# Patient Record
Sex: FEMALE | Race: OTHER | Employment: FULL TIME | URBAN - METROPOLITAN AREA
[De-identification: names, ages, dates, MRNs, and addresses within clinical notes are randomized per-mention and may not be internally consistent; named-entity substitution may affect disease eponyms.]

---

## 2018-02-01 PROBLEM — J02.9 ALLERGIC PHARYNGITIS: Status: ACTIVE | Noted: 2017-03-12

## 2018-02-01 PROBLEM — R09.81 NASAL CONGESTION: Status: ACTIVE | Noted: 2018-01-11

## 2018-04-19 PROBLEM — B37.31 YEAST VAGINITIS: Status: ACTIVE | Noted: 2018-04-19

## 2018-05-04 PROBLEM — D22.30 MELANOCYTIC NEVI OF FACE: Status: ACTIVE | Noted: 2018-05-04

## 2018-06-04 PROBLEM — D23.9 INTRADERMAL NEVUS: Status: ACTIVE | Noted: 2018-06-04

## 2019-02-20 ENCOUNTER — OFFICE VISIT (OUTPATIENT)
Dept: PRIMARY CARE CLINIC | Age: 24
End: 2019-02-20
Payer: COMMERCIAL

## 2019-02-20 VITALS
HEART RATE: 69 BPM | TEMPERATURE: 98.2 F | OXYGEN SATURATION: 99 % | BODY MASS INDEX: 26.01 KG/M2 | WEIGHT: 133.2 LBS | SYSTOLIC BLOOD PRESSURE: 106 MMHG | DIASTOLIC BLOOD PRESSURE: 74 MMHG

## 2019-02-20 DIAGNOSIS — R30.0 DYSURIA: Primary | ICD-10-CM

## 2019-02-20 DIAGNOSIS — N89.8 VAGINAL DISCHARGE: ICD-10-CM

## 2019-02-20 LAB
BILIRUBIN, POC: NORMAL
BLOOD URINE, POC: NORMAL
CLARITY, POC: CLEAR
COLOR, POC: YELLOW
GLUCOSE URINE, POC: NORMAL
KETONES, POC: NORMAL
LEUKOCYTE EST, POC: NORMAL
NITRITE, POC: NORMAL
PH, POC: 7
PROTEIN, POC: NORMAL
SPECIFIC GRAVITY, POC: 1.01
UROBILINOGEN, POC: 0.2

## 2019-02-20 PROCEDURE — 81003 URINALYSIS AUTO W/O SCOPE: CPT | Performed by: PHYSICIAN ASSISTANT

## 2019-02-20 PROCEDURE — 99213 OFFICE O/P EST LOW 20 MIN: CPT | Performed by: PHYSICIAN ASSISTANT

## 2019-02-20 ASSESSMENT — PATIENT HEALTH QUESTIONNAIRE - PHQ9
SUM OF ALL RESPONSES TO PHQ9 QUESTIONS 1 & 2: 0
SUM OF ALL RESPONSES TO PHQ QUESTIONS 1-9: 0
1. LITTLE INTEREST OR PLEASURE IN DOING THINGS: 0
2. FEELING DOWN, DEPRESSED OR HOPELESS: 0
SUM OF ALL RESPONSES TO PHQ QUESTIONS 1-9: 0

## 2019-02-21 ENCOUNTER — TELEPHONE (OUTPATIENT)
Dept: PRIMARY CARE CLINIC | Age: 24
End: 2019-02-21

## 2019-02-21 LAB
CANDIDA SPECIES, DNA PROBE: NEGATIVE
GARDNERELLA VAGINALIS, DNA PROBE: NEGATIVE
TRICHOMONAS VAGINALIS DNA: NEGATIVE

## 2019-02-26 ASSESSMENT — ENCOUNTER SYMPTOMS
VOMITING: 0
NAUSEA: 0
ABDOMINAL PAIN: 0

## 2019-04-15 RX ORDER — FEXOFENADINE HCL 180 MG/1
180 TABLET ORAL DAILY
Qty: 30 TABLET | Refills: 3 | Status: SHIPPED | OUTPATIENT
Start: 2019-04-15 | End: 2022-03-04

## 2019-10-22 ENCOUNTER — TELEPHONE (OUTPATIENT)
Dept: ADMINISTRATIVE | Age: 24
End: 2019-10-22

## 2019-10-28 ENCOUNTER — OFFICE VISIT (OUTPATIENT)
Dept: PRIMARY CARE CLINIC | Age: 24
End: 2019-10-28
Payer: COMMERCIAL

## 2019-10-28 VITALS
DIASTOLIC BLOOD PRESSURE: 68 MMHG | OXYGEN SATURATION: 97 % | HEIGHT: 60 IN | WEIGHT: 139.6 LBS | SYSTOLIC BLOOD PRESSURE: 118 MMHG | BODY MASS INDEX: 27.41 KG/M2 | HEART RATE: 87 BPM

## 2019-10-28 DIAGNOSIS — L20.9 ATOPIC DERMATITIS, UNSPECIFIED TYPE: ICD-10-CM

## 2019-10-28 PROCEDURE — 99213 OFFICE O/P EST LOW 20 MIN: CPT | Performed by: PHYSICIAN ASSISTANT

## 2019-10-28 ASSESSMENT — ENCOUNTER SYMPTOMS
COLOR CHANGE: 0
RESPIRATORY NEGATIVE: 1
EYES NEGATIVE: 1
ABDOMINAL PAIN: 0

## 2020-12-09 ENCOUNTER — TELEPHONE (OUTPATIENT)
Dept: PRIMARY CARE CLINIC | Age: 25
End: 2020-12-09

## 2020-12-09 NOTE — TELEPHONE ENCOUNTER
Pt calling to ask if it is okay for her to get the Flu vaccine? Back in 2018 she was dx'd with Latent TB and was told not to get it and she did not get it last year. Her job requires her to get it this year. Please advise. [phone]. Can leave a detailed message, if pt does not .

## 2021-01-15 ENCOUNTER — TELEPHONE (OUTPATIENT)
Dept: PRIMARY CARE CLINIC | Age: 26
End: 2021-01-15

## 2021-01-15 NOTE — TELEPHONE ENCOUNTER
[facility] received a call from:    Name of Caller: Bryon Kenyon    Relationship to patient: self    Organization name: n/a    Best contact number: [phone]    Reason for call:  Patient tested postive for Covid 19. She is asking for her provider to call her in a prescription for an inhaler due to SOB.   She is asking that it be sent to pharmacy listed below    [facility]  [address]  [place] [phone]        Thank you

## 2021-01-19 RX ORDER — ALBUTEROL SULFATE 90 UG/1
2 AEROSOL, METERED RESPIRATORY (INHALATION) EVERY 6 HOURS PRN
Qty: 1 INHALER | Refills: 3 | Status: SHIPPED | OUTPATIENT
Start: 2021-01-19 | End: 2022-03-04

## 2021-01-20 NOTE — TELEPHONE ENCOUNTER
Sent in  [address]- please call pt and apologize for the delay and also find out what happened. Let pt know that when she calls if she doesn't hear back within 24 to 48 hours she needs to call back.   Also please let her know that inhalers don't really work that great for the SOB that people get with covid but okay to try it

## 2022-03-04 ENCOUNTER — OFFICE VISIT (OUTPATIENT)
Dept: PRIMARY CARE CLINIC | Age: 27
End: 2022-03-04
Payer: COMMERCIAL

## 2022-03-04 VITALS
HEIGHT: 60 IN | SYSTOLIC BLOOD PRESSURE: 122 MMHG | DIASTOLIC BLOOD PRESSURE: 82 MMHG | WEIGHT: 139.8 LBS | HEART RATE: 113 BPM | BODY MASS INDEX: 27.45 KG/M2 | OXYGEN SATURATION: 98 %

## 2022-03-04 DIAGNOSIS — R00.2 PALPITATION: Primary | ICD-10-CM

## 2022-03-04 DIAGNOSIS — R00.2 PALPITATION: ICD-10-CM

## 2022-03-04 LAB
ABSOLUTE EOS #: 0.27 K/UL (ref 0–0.44)
ABSOLUTE IMMATURE GRANULOCYTE: 0.04 K/UL (ref 0–0.3)
ABSOLUTE LYMPH #: 2 K/UL (ref 1.1–3.7)
ABSOLUTE MONO #: 0.78 K/UL (ref 0.1–1.2)
BASOPHILS # BLD: 1 % (ref 0–2)
BASOPHILS ABSOLUTE: 0.12 K/UL (ref 0–0.2)
DIFFERENTIAL TYPE: ABNORMAL
EOSINOPHILS RELATIVE PERCENT: 2 % (ref 1–4)
HCT VFR BLD CALC: 37.3 % (ref 36.3–47.1)
HEMOGLOBIN: 12.1 G/DL (ref 11.9–15.1)
IMMATURE GRANULOCYTES: 0 %
LYMPHOCYTES # BLD: 17 % (ref 24–43)
MCH RBC QN AUTO: 30.4 PG (ref 25.2–33.5)
MCHC RBC AUTO-ENTMCNC: 32.4 G/DL (ref 28.4–34.8)
MCV RBC AUTO: 93.7 FL (ref 82.6–102.9)
MONOCYTES # BLD: 7 % (ref 3–12)
NRBC AUTOMATED: 0 PER 100 WBC
PDW BLD-RTO: 12.2 % (ref 11.8–14.4)
PLATELET # BLD: 397 K/UL (ref 138–453)
PLATELET ESTIMATE: ABNORMAL
PMV BLD AUTO: 11 FL (ref 8.1–13.5)
RBC # BLD: 3.98 M/UL (ref 3.95–5.11)
RBC # BLD: ABNORMAL 10*6/UL
SEG NEUTROPHILS: 73 % (ref 36–65)
SEGMENTED NEUTROPHILS ABSOLUTE COUNT: 8.31 K/UL (ref 1.5–8.1)
WBC # BLD: 11.5 K/UL (ref 3.5–11.3)
WBC # BLD: ABNORMAL 10*3/UL

## 2022-03-04 PROCEDURE — G8484 FLU IMMUNIZE NO ADMIN: HCPCS | Performed by: PHYSICIAN ASSISTANT

## 2022-03-04 PROCEDURE — 1036F TOBACCO NON-USER: CPT | Performed by: PHYSICIAN ASSISTANT

## 2022-03-04 PROCEDURE — G8427 DOCREV CUR MEDS BY ELIG CLIN: HCPCS | Performed by: PHYSICIAN ASSISTANT

## 2022-03-04 PROCEDURE — G8419 CALC BMI OUT NRM PARAM NOF/U: HCPCS | Performed by: PHYSICIAN ASSISTANT

## 2022-03-04 PROCEDURE — 99213 OFFICE O/P EST LOW 20 MIN: CPT | Performed by: PHYSICIAN ASSISTANT

## 2022-03-04 SDOH — ECONOMIC STABILITY: FOOD INSECURITY: WITHIN THE PAST 12 MONTHS, THE FOOD YOU BOUGHT JUST DIDN'T LAST AND YOU DIDN'T HAVE MONEY TO GET MORE.: NEVER TRUE

## 2022-03-04 SDOH — ECONOMIC STABILITY: FOOD INSECURITY: WITHIN THE PAST 12 MONTHS, YOU WORRIED THAT YOUR FOOD WOULD RUN OUT BEFORE YOU GOT MONEY TO BUY MORE.: NEVER TRUE

## 2022-03-04 ASSESSMENT — ENCOUNTER SYMPTOMS: SHORTNESS OF BREATH: 1

## 2022-03-04 ASSESSMENT — PATIENT HEALTH QUESTIONNAIRE - PHQ9
SUM OF ALL RESPONSES TO PHQ QUESTIONS 1-9: 0
SUM OF ALL RESPONSES TO PHQ9 QUESTIONS 1 & 2: 0
2. FEELING DOWN, DEPRESSED OR HOPELESS: 0
1. LITTLE INTEREST OR PLEASURE IN DOING THINGS: 0
SUM OF ALL RESPONSES TO PHQ QUESTIONS 1-9: 0

## 2022-03-04 ASSESSMENT — SOCIAL DETERMINANTS OF HEALTH (SDOH): HOW HARD IS IT FOR YOU TO PAY FOR THE VERY BASICS LIKE FOOD, HOUSING, MEDICAL CARE, AND HEATING?: NOT HARD AT ALL

## 2022-03-05 LAB
ALBUMIN SERPL-MCNC: 4.8 G/DL (ref 3.5–5.2)
ALBUMIN/GLOBULIN RATIO: 1.8 (ref 1–2.5)
ALP BLD-CCNC: 57 U/L (ref 35–104)
ALT SERPL-CCNC: 9 U/L (ref 5–33)
ANION GAP SERPL CALCULATED.3IONS-SCNC: 23 MMOL/L (ref 9–17)
AST SERPL-CCNC: 15 U/L
BILIRUB SERPL-MCNC: 0.39 MG/DL (ref 0.3–1.2)
BUN BLDV-MCNC: 7 MG/DL (ref 6–20)
BUN/CREAT BLD: ABNORMAL (ref 9–20)
CALCIUM SERPL-MCNC: 10.2 MG/DL (ref 8.6–10.4)
CHLORIDE BLD-SCNC: 109 MMOL/L (ref 98–107)
CO2: 19 MMOL/L (ref 20–31)
CREAT SERPL-MCNC: 0.61 MG/DL (ref 0.5–0.9)
GFR AFRICAN AMERICAN: >60 ML/MIN
GFR NON-AFRICAN AMERICAN: >60 ML/MIN
GFR SERPL CREATININE-BSD FRML MDRD: ABNORMAL ML/MIN/{1.73_M2}
GFR SERPL CREATININE-BSD FRML MDRD: ABNORMAL ML/MIN/{1.73_M2}
GLUCOSE FASTING: 74 MG/DL (ref 70–99)
POTASSIUM SERPL-SCNC: 4.6 MMOL/L (ref 3.7–5.3)
SODIUM BLD-SCNC: 151 MMOL/L (ref 135–144)
TOTAL PROTEIN: 7.5 G/DL (ref 6.4–8.3)
TSH SERPL DL<=0.05 MIU/L-ACNC: 1.41 MIU/L (ref 0.3–5)

## 2022-03-08 ENCOUNTER — TELEPHONE (OUTPATIENT)
Dept: PRIMARY CARE CLINIC | Age: 27
End: 2022-03-08

## 2022-03-08 DIAGNOSIS — E87.0 HYPERNATREMIA: Primary | ICD-10-CM

## 2022-03-08 NOTE — TELEPHONE ENCOUNTER
Patient states she does not take any water pills, does not drink any electrolytes solutions. Patient states she does drink Collagen - unsure if this would cause any changes. Patient takes a multivitamin, calcium & B-12 OTC. States that occasionally she will drink a CORE water, but not often. Denies using salt on foods - states she uses salt and pepper while cooking but no added salt once food is done. Pt denies confusion or any new sx's at this time. Patient will complete blood work.

## 2022-03-08 NOTE — TELEPHONE ENCOUNTER
----- Message from Alona Gutierrez sent at 3/7/2022  1:29 PM EST -----  Subject: Results Request    QUESTIONS  Which lab or imaging result is the patient calling about? lab results  Which provider ordered the test? [facility]    [facility]  Date the test was performed? 2022-03-04  Additional Information for Provider? Pt is calling requesting her lab   results from labs taken on 3/4. Unable to contact office at this time. Please call pt with these results. ---------------------------------------------------------------------------  --------------  Lear How INFO  What is the best way for the office to contact you? OK to leave message on   voicemail  Preferred Call Back Phone Number?  [phone]

## 2022-03-09 DIAGNOSIS — E87.0 HYPERNATREMIA: ICD-10-CM

## 2022-03-09 LAB
ANION GAP SERPL CALCULATED.3IONS-SCNC: 22 MMOL/L (ref 9–17)
BUN BLDV-MCNC: 17 MG/DL (ref 6–20)
BUN/CREAT BLD: ABNORMAL (ref 9–20)
CALCIUM SERPL-MCNC: 10.4 MG/DL (ref 8.6–10.4)
CHLORIDE BLD-SCNC: 100 MMOL/L (ref 98–107)
CO2: 20 MMOL/L (ref 20–31)
CREAT SERPL-MCNC: 0.68 MG/DL (ref 0.5–0.9)
GFR AFRICAN AMERICAN: >60 ML/MIN
GFR NON-AFRICAN AMERICAN: >60 ML/MIN
GFR SERPL CREATININE-BSD FRML MDRD: ABNORMAL ML/MIN/{1.73_M2}
GFR SERPL CREATININE-BSD FRML MDRD: ABNORMAL ML/MIN/{1.73_M2}
GLUCOSE BLD-MCNC: 86 MG/DL (ref 70–99)
POTASSIUM SERPL-SCNC: 5 MMOL/L (ref 3.7–5.3)
SODIUM BLD-SCNC: 142 MMOL/L (ref 135–144)

## 2022-03-18 LAB — ANTI-DIURETIC HORMONE: <0.5 PG/ML (ref 0–6.9)

## 2022-10-10 ENCOUNTER — TELEPHONE (OUTPATIENT)
Dept: PRIMARY CARE CLINIC | Age: 27
End: 2022-10-10

## 2022-10-10 DIAGNOSIS — E87.0 SERUM SODIUM ELEVATED: Primary | ICD-10-CM

## 2022-10-10 DIAGNOSIS — F40.243 ANXIETY WITH FLYING: ICD-10-CM

## 2022-10-10 DIAGNOSIS — R79.89 ELEVATED LFTS: ICD-10-CM

## 2022-10-10 RX ORDER — DIAZEPAM 2 MG/1
TABLET ORAL
Qty: 10 TABLET | Refills: 0 | Status: SHIPPED | OUTPATIENT
Start: 2022-10-10 | End: 2022-10-19

## 2022-10-10 NOTE — TELEPHONE ENCOUNTER
I sent in some valium for her. She had an elevated Sodium on last labs in March and I wanted a repeat. Please ask her to have that done.

## 2022-10-10 NOTE — TELEPHONE ENCOUNTER
Pt is flying out on Wed and her anxiety is getting worse. Asking, if something can be sent in to help her stay calm for the flight wed and for the flight back? Uses [facility].

## 2023-07-10 ENCOUNTER — OFFICE VISIT (OUTPATIENT)
Dept: PRIMARY CARE CLINIC | Age: 28
End: 2023-07-10
Payer: COMMERCIAL

## 2023-07-10 VITALS
HEIGHT: 61 IN | SYSTOLIC BLOOD PRESSURE: 110 MMHG | BODY MASS INDEX: 29.45 KG/M2 | OXYGEN SATURATION: 97 % | HEART RATE: 86 BPM | DIASTOLIC BLOOD PRESSURE: 68 MMHG | WEIGHT: 156 LBS

## 2023-07-10 DIAGNOSIS — R53.1 WEAKNESS: Primary | ICD-10-CM

## 2023-07-10 DIAGNOSIS — R53.83 OTHER FATIGUE: ICD-10-CM

## 2023-07-10 PROCEDURE — 99214 OFFICE O/P EST MOD 30 MIN: CPT | Performed by: FAMILY MEDICINE

## 2023-07-10 SDOH — ECONOMIC STABILITY: FOOD INSECURITY: WITHIN THE PAST 12 MONTHS, YOU WORRIED THAT YOUR FOOD WOULD RUN OUT BEFORE YOU GOT MONEY TO BUY MORE.: NEVER TRUE

## 2023-07-10 SDOH — ECONOMIC STABILITY: HOUSING INSECURITY
IN THE LAST 12 MONTHS, WAS THERE A TIME WHEN YOU DID NOT HAVE A STEADY PLACE TO SLEEP OR SLEPT IN A SHELTER (INCLUDING NOW)?: NO

## 2023-07-10 SDOH — ECONOMIC STABILITY: FOOD INSECURITY: WITHIN THE PAST 12 MONTHS, THE FOOD YOU BOUGHT JUST DIDN'T LAST AND YOU DIDN'T HAVE MONEY TO GET MORE.: NEVER TRUE

## 2023-07-10 SDOH — ECONOMIC STABILITY: INCOME INSECURITY: HOW HARD IS IT FOR YOU TO PAY FOR THE VERY BASICS LIKE FOOD, HOUSING, MEDICAL CARE, AND HEATING?: NOT HARD AT ALL

## 2023-07-10 ASSESSMENT — PATIENT HEALTH QUESTIONNAIRE - PHQ9
SUM OF ALL RESPONSES TO PHQ QUESTIONS 1-9: 0
1. LITTLE INTEREST OR PLEASURE IN DOING THINGS: 0
SUM OF ALL RESPONSES TO PHQ QUESTIONS 1-9: 0
SUM OF ALL RESPONSES TO PHQ9 QUESTIONS 1 & 2: 0
2. FEELING DOWN, DEPRESSED OR HOPELESS: 0

## 2023-07-10 ASSESSMENT — ENCOUNTER SYMPTOMS
TROUBLE SWALLOWING: 1
VOMITING: 0
DIARRHEA: 0
COUGH: 0
SINUS PRESSURE: 1
CONSTIPATION: 1
SHORTNESS OF BREATH: 0
NAUSEA: 0

## 2023-07-12 DIAGNOSIS — R53.83 OTHER FATIGUE: ICD-10-CM

## 2023-07-12 DIAGNOSIS — R53.1 WEAKNESS: ICD-10-CM

## 2023-07-12 LAB
ABSOLUTE EOS #: 0.46 K/UL (ref 0–0.44)
ABSOLUTE IMMATURE GRANULOCYTE: <0.03 K/UL (ref 0–0.3)
ABSOLUTE LYMPH #: 1.71 K/UL (ref 1.1–3.7)
ABSOLUTE MONO #: 0.38 K/UL (ref 0.1–1.2)
ALBUMIN SERPL-MCNC: 4.2 G/DL (ref 3.5–5.2)
ALBUMIN/GLOBULIN RATIO: 1.4 (ref 1–2.5)
ALP BLD-CCNC: 57 U/L (ref 35–104)
ALT SERPL-CCNC: 9 U/L (ref 5–33)
ANION GAP SERPL CALCULATED.3IONS-SCNC: 11 MMOL/L (ref 9–17)
AST SERPL-CCNC: 14 U/L
BASOPHILS # BLD: 1 % (ref 0–2)
BASOPHILS ABSOLUTE: 0.09 K/UL (ref 0–0.2)
BILIRUB SERPL-MCNC: 0.4 MG/DL (ref 0.3–1.2)
BUN BLDV-MCNC: 12 MG/DL (ref 6–20)
CALCIUM SERPL-MCNC: 9.5 MG/DL (ref 8.6–10.4)
CHLORIDE BLD-SCNC: 104 MMOL/L (ref 98–107)
CO2: 25 MMOL/L (ref 20–31)
CREAT SERPL-MCNC: 0.7 MG/DL (ref 0.5–0.9)
EOSINOPHILS RELATIVE PERCENT: 7 % (ref 1–4)
FOLATE: 15.3 NG/ML
GFR SERPL CREATININE-BSD FRML MDRD: >60 ML/MIN/1.73M2
GLUCOSE BLD-MCNC: 140 MG/DL (ref 70–99)
HCT VFR BLD CALC: 36.5 % (ref 36.3–47.1)
HEMOGLOBIN: 11.8 G/DL (ref 11.9–15.1)
IMMATURE GRANULOCYTES: 0 %
LYMPHOCYTES # BLD: 26 % (ref 24–43)
MCH RBC QN AUTO: 30.5 PG (ref 25.2–33.5)
MCHC RBC AUTO-ENTMCNC: 32.3 G/DL (ref 28.4–34.8)
MCV RBC AUTO: 94.3 FL (ref 82.6–102.9)
MONOCYTES # BLD: 6 % (ref 3–12)
NRBC AUTOMATED: 0 PER 100 WBC
PDW BLD-RTO: 12.5 % (ref 11.8–14.4)
PLATELET # BLD: 342 K/UL (ref 138–453)
PMV BLD AUTO: 10.9 FL (ref 8.1–13.5)
POTASSIUM SERPL-SCNC: 4.4 MMOL/L (ref 3.7–5.3)
RBC # BLD: 3.87 M/UL (ref 3.95–5.11)
SEG NEUTROPHILS: 60 % (ref 36–65)
SEGMENTED NEUTROPHILS ABSOLUTE COUNT: 3.99 K/UL (ref 1.5–8.1)
SODIUM BLD-SCNC: 140 MMOL/L (ref 135–144)
T3 FREE: 2.7 PG/ML (ref 2.02–4.43)
THYROXINE, FREE: 1.1 NG/DL (ref 0.9–1.7)
TOTAL PROTEIN: 7.3 G/DL (ref 6.4–8.3)
TSH SERPL DL<=0.05 MIU/L-ACNC: 2.82 UIU/ML (ref 0.3–5)
VITAMIN B-12: 666 PG/ML (ref 232–1245)
WBC # BLD: 6.6 K/UL (ref 3.5–11.3)

## 2023-07-14 LAB
THYROGLOBULIN AB: <12 IU/ML (ref 0–40)
THYROID PEROXIDASE (TPO) AB: <4 IU/ML (ref 0–25)

## 2023-08-07 ENCOUNTER — OFFICE VISIT (OUTPATIENT)
Dept: PRIMARY CARE CLINIC | Age: 28
End: 2023-08-07
Payer: COMMERCIAL

## 2023-08-07 VITALS
DIASTOLIC BLOOD PRESSURE: 84 MMHG | HEIGHT: 61 IN | HEART RATE: 85 BPM | WEIGHT: 153 LBS | OXYGEN SATURATION: 96 % | SYSTOLIC BLOOD PRESSURE: 122 MMHG | BODY MASS INDEX: 28.89 KG/M2

## 2023-08-07 DIAGNOSIS — N64.4 BREAST PAIN: Primary | ICD-10-CM

## 2023-08-07 DIAGNOSIS — E16.2 HYPOGLYCEMIA: ICD-10-CM

## 2023-08-07 PROCEDURE — 99213 OFFICE O/P EST LOW 20 MIN: CPT | Performed by: FAMILY MEDICINE

## 2023-08-07 NOTE — PROGRESS NOTES
[id number] PRIMARY CARE  [facility] [address]  Dept: [phone]    Alexander Tee is a 32 y.o. female Established patient, who presents today for her medical conditions/complaints as noted below. Chief Complaint   Patient presents with    Breast Problem     Complains of left breast looking different from the right breast.     Anxiety     1 month follow up     Memory Loss     Patient is here complaining of constant Brain fog, Started when she had Covid 2 years ago. Worsening over the past few months. HPI:   Brain fog seems like it has gotten better overall, but bad today. Eating better- staying away from carbs- eating more protein and not having shakiness so bad. Still feeling anxiety at times. Sometimes related to blood sugar, but sometimes feels shakey also. Forgets simple words at times. Occas pain in breast- dull pain- comes and goes. One breast is shaped differently than the other. No lumps that she has noticed. No nipple discharge or bleeding. Reviewed prior notes: None   Reviewed previous:  Labs    No results found for: LDLCHOLESTEROL, LDLCALC    (goal LDL is <100)   AST (U/L)   Date Value   07/12/2023 14     ALT (U/L)   Date Value   07/12/2023 9     BUN (mg/dL)   Date Value   07/12/2023 12     TSH (uIU/mL)   Date Value   07/12/2023 2.82     BP Readings from Last 3 Encounters:   08/07/23 122/84   07/10/23 110/68   03/04/22 122/82          (goal 120/80)    No past medical history on file.    Past Surgical History:   Procedure Laterality Date    PRE-MALIGNANT / BENIGN SKIN LESION EXCISION Right 05/24/2018    Right Cheek Lesion- Dr. Misha Espinoza       Family History   Problem Relation Age of Onset    High Blood Pressure Mother     High Blood Pressure Father     Other Sister         dpression with anxiety    Cancer Maternal Grandfather         lung cancer    Cancer Paternal Grandfather         lung cancer    Heart Attack

## 2023-08-08 ENCOUNTER — TELEPHONE (OUTPATIENT)
Dept: PRIMARY CARE CLINIC | Age: 28
End: 2023-08-08

## 2023-08-08 DIAGNOSIS — N64.4 BREAST PAIN: Primary | ICD-10-CM

## 2023-08-08 NOTE — TELEPHONE ENCOUNTER
[facility] scheduling department called stating that when a pt is under 30 and has an order for a diagnostic mamm, they are required to have an [address] done first, She stated most of the times they put in a transcribe order for us if we don't but states for this, they need an actual order from pcp      Please advise / place 19 Kimi Pool in epic

## 2023-08-14 ENCOUNTER — TELEPHONE (OUTPATIENT)
Dept: PRIMARY CARE CLINIC | Age: 28
End: 2023-08-14

## 2023-08-14 DIAGNOSIS — R53.1 WEAKNESS: ICD-10-CM

## 2023-08-14 DIAGNOSIS — R53.83 OTHER FATIGUE: ICD-10-CM

## 2023-08-14 DIAGNOSIS — E16.2 HYPOGLYCEMIA: Primary | ICD-10-CM

## 2023-08-14 DIAGNOSIS — N64.4 BREAST PAIN: ICD-10-CM

## 2023-08-14 NOTE — TELEPHONE ENCOUNTER
Pt called stating she was seen in office on 8/7/23 and was given a dx of hypoglycemia and advised to follow diet recommendations and no further testing was really needed. Pt states the information packet she received says that is not a primary dx and it is due to an under lying issue, she states it says pt's with dx of this should have further testing. Pt feels this information is not sitting well with her and does not know what to do, she is following diet and does not feel better. Asking if there is more testing or any f/u she needs to do.        Please advise

## 2023-08-14 NOTE — TELEPHONE ENCOUNTER
She told me she was better with the diet. .... I ordered MRI brain and another lab test- most of the time it is a precursor to diabetes, but we will check these other things as well.

## 2023-08-15 ENCOUNTER — TELEPHONE (OUTPATIENT)
Dept: PRIMARY CARE CLINIC | Age: 28
End: 2023-08-15

## 2023-08-15 DIAGNOSIS — E16.2 HYPOGLYCEMIA: Primary | ICD-10-CM

## 2023-08-15 DIAGNOSIS — N64.4 BREAST PAIN: ICD-10-CM

## 2023-08-15 DIAGNOSIS — R53.1 WEAKNESS: ICD-10-CM

## 2023-08-15 NOTE — TELEPHONE ENCOUNTER
[facility] scheduling calling. Needs the MRI put in, with and w/o contrast due to looking for a tumor. Will watch Epic for the change in order.  NATI

## 2023-08-16 DIAGNOSIS — R53.1 WEAKNESS: ICD-10-CM

## 2023-08-16 DIAGNOSIS — R53.83 OTHER FATIGUE: ICD-10-CM

## 2023-08-16 DIAGNOSIS — N64.4 BREAST PAIN: Primary | ICD-10-CM

## 2023-08-16 DIAGNOSIS — N64.4 BREAST PAIN: ICD-10-CM

## 2023-08-16 DIAGNOSIS — E16.2 HYPOGLYCEMIA: ICD-10-CM

## 2023-08-16 LAB
CORTISOL COLLECTION INFO: NORMAL
CORTISOL: 6.5 UG/DL (ref 2.7–18.4)

## 2023-08-25 ENCOUNTER — HOSPITAL ENCOUNTER (OUTPATIENT)
Dept: WOMENS IMAGING | Age: 28
Discharge: HOME | End: 2023-08-25
Payer: COMMERCIAL

## 2023-08-25 ENCOUNTER — HOSPITAL ENCOUNTER (OUTPATIENT)
Dept: WOMENS IMAGING | Age: 28
End: 2023-08-25
Payer: COMMERCIAL

## 2023-08-25 DIAGNOSIS — N64.4 BREAST PAIN: ICD-10-CM

## 2023-08-25 PROCEDURE — 76642 ULTRASOUND BREAST LIMITED: CPT

## 2023-08-28 ENCOUNTER — HOSPITAL ENCOUNTER (OUTPATIENT)
Dept: MRI IMAGING | Age: 28
Discharge: HOME | End: 2023-08-30
Payer: COMMERCIAL

## 2023-08-28 DIAGNOSIS — N64.4 BREAST PAIN: ICD-10-CM

## 2023-08-28 DIAGNOSIS — R53.1 WEAKNESS: ICD-10-CM

## 2023-08-28 DIAGNOSIS — E16.2 HYPOGLYCEMIA: ICD-10-CM

## 2023-08-28 PROCEDURE — A9579 GAD-BASE MR CONTRAST NOS,1ML: HCPCS | Performed by: FAMILY MEDICINE

## 2023-08-28 PROCEDURE — 6360000004 HC RX CONTRAST MEDICATION: Performed by: FAMILY MEDICINE

## 2023-08-28 PROCEDURE — 70553 MRI BRAIN STEM W/O & W/DYE: CPT

## 2023-08-28 RX ADMIN — GADOTERIDOL 13 ML: 279.3 INJECTION, SOLUTION INTRAVENOUS at 15:36

## 2023-08-29 NOTE — RESULT ENCOUNTER NOTE
Adv pt that MRI shows the pituitary gland may not be functioning totally normally- which goes along with her hypoglycemia. Would recommend follow up in the office to discuss.

## 2023-09-07 ENCOUNTER — OFFICE VISIT (OUTPATIENT)
Dept: PRIMARY CARE CLINIC | Age: 28
End: 2023-09-07
Payer: COMMERCIAL

## 2023-09-07 VITALS
WEIGHT: 152.4 LBS | DIASTOLIC BLOOD PRESSURE: 78 MMHG | SYSTOLIC BLOOD PRESSURE: 124 MMHG | HEIGHT: 61 IN | BODY MASS INDEX: 28.77 KG/M2 | HEART RATE: 76 BPM | OXYGEN SATURATION: 98 %

## 2023-09-07 DIAGNOSIS — D35.2 PITUITARY ADENOMA (HCC): ICD-10-CM

## 2023-09-07 DIAGNOSIS — E16.2 HYPOGLYCEMIA: Primary | ICD-10-CM

## 2023-09-07 PROCEDURE — 99213 OFFICE O/P EST LOW 20 MIN: CPT | Performed by: FAMILY MEDICINE

## 2023-09-07 NOTE — PROGRESS NOTES
[id number] PRIMARY CARE  [facility] [address]  Dept: [phone]    Alfie Nichols is a 32 y.o. female Established patient, who presents today for her medical conditions/complaints as noted below. Chief Complaint   Patient presents with    Results     Pt here today to discuss MRI results. HPI:   Pt still having some symptoms and struggling with keeping sugars up sometimes. Reviewed prior notes: None   Reviewed previous:  Labs and Imaging    No results found for: LDLCHOLESTEROL, LDLCALC    (goal LDL is <100)   AST (U/L)   Date Value   07/12/2023 14     ALT (U/L)   Date Value   07/12/2023 9     BUN (mg/dL)   Date Value   07/12/2023 12     TSH (uIU/mL)   Date Value   07/12/2023 2.82     BP Readings from Last 3 Encounters:   09/07/23 124/78   08/07/23 122/84   07/10/23 110/68          (goal 120/80)    No past medical history on file. Past Surgical History:   Procedure Laterality Date    PRE-MALIGNANT / BENIGN SKIN LESION EXCISION Right 05/24/2018    Right Cheek Lesion- Dr. Radha Christian       Family History   Problem Relation Age of Onset    High Blood Pressure Mother     High Blood Pressure Father     Other Sister         dpression with anxiety    Cancer Maternal Grandfather         lung cancer    Cancer Paternal Grandfather         lung cancer    Cancer Maternal Aunt         colon cancer    Heart Attack Paternal Uncle     Breast Cancer Neg Hx        Social History     Tobacco Use    Smoking status: Never    Smokeless tobacco: Never   Substance Use Topics    Alcohol use: Yes     Comment: socially      No current outpatient medications on file. No current facility-administered medications for this visit.      No Known Allergies    Health Maintenance   Topic Date Due    COVID-19 Vaccine (1) Never done    HIV screen  Never done    Hepatitis C screen  Never done    Varicella vaccine (1 of 2 - 2-dose childhood series) Never done    Pap smear

## 2023-09-08 ENCOUNTER — TELEPHONE (OUTPATIENT)
Dept: PRIMARY CARE CLINIC | Age: 28
End: 2023-09-08

## 2023-09-08 NOTE — TELEPHONE ENCOUNTER
----- Message from Ozzie Ang sent at 9/7/2023  3:44 PM EDT -----  Subject: Message to Provider    QUESTIONS  Information for Provider? Pt needs another referral to endocrinologist   because the one she was referred to doesn't take her insurance. Pls call   the pt back. ---------------------------------------------------------------------------  --------------  Lily Ferraro [facility]  [phone]; OK to leave message on voicemail  ---------------------------------------------------------------------------  --------------  SCRIPT ANSWERS  Relationship to Patient?  Self

## 2023-09-08 NOTE — TELEPHONE ENCOUNTER
Have her check with her insurance and see who is covered. The only other one's I know of are Dr. Elwyn Phalen who is retiring and not taking new pts, [facility]

## 2023-11-13 ENCOUNTER — TELEPHONE (OUTPATIENT)
Dept: PRIMARY CARE CLINIC | Age: 28
End: 2023-11-13

## 2023-11-13 NOTE — TELEPHONE ENCOUNTER
Pt asking if she can have a letter emailed to her stating that she needs to take her glucometer on the plane due to her medical condition. Would like it emailed to her @ [email or web address]    Pt going on vacation soon.   Let her know @ [phone]

## 2023-11-28 ENCOUNTER — TRANSCRIBE ORDERS (OUTPATIENT)
Dept: ADMINISTRATIVE | Age: 28
End: 2023-11-28

## 2023-11-28 DIAGNOSIS — D35.2 PITUITARY ADENOMA (HCC): Primary | ICD-10-CM

## 2023-11-30 ENCOUNTER — HOSPITAL ENCOUNTER (OUTPATIENT)
Age: 28
Discharge: HOME | End: 2023-11-30
Payer: COMMERCIAL

## 2023-11-30 LAB
ANION GAP SERPL CALCULATED.3IONS-SCNC: 11 MMOL/L (ref 9–16)
BUN SERPL-MCNC: 14 MG/DL (ref 6–20)
CALCIUM SERPL-MCNC: 9.5 MG/DL (ref 8.6–10.4)
CHLORIDE SERPL-SCNC: 102 MMOL/L (ref 98–107)
CO2 SERPL-SCNC: 25 MMOL/L (ref 20–31)
CORTIS SERPL-MCNC: 15.1 UG/DL (ref 2.5–19.5)
CORTISOL COLLECTION INFO: 830
CREAT SERPL-MCNC: 0.6 MG/DL (ref 0.5–0.9)
EST. AVERAGE GLUCOSE BLD GHB EST-MCNC: 108 MG/DL
ESTRADIOL LEVEL: 180 PG/ML
FSH SERPL-ACNC: 1.9 MIU/ML
GFR SERPL CREATININE-BSD FRML MDRD: >60 ML/MIN/1.73M2
GLUCOSE SERPL-MCNC: 86 MG/DL (ref 74–99)
HBA1C MFR BLD: 5.4 % (ref 4–6)
POTASSIUM SERPL-SCNC: 4.4 MMOL/L (ref 3.7–5.3)
SODIUM SERPL-SCNC: 138 MMOL/L (ref 136–145)
T4 FREE SERPL-MCNC: 1.1 NG/DL (ref 0.93–1.7)

## 2023-11-30 PROCEDURE — 80048 BASIC METABOLIC PNL TOTAL CA: CPT

## 2023-11-30 PROCEDURE — 83036 HEMOGLOBIN GLYCOSYLATED A1C: CPT

## 2023-11-30 PROCEDURE — 84305 ASSAY OF SOMATOMEDIN: CPT

## 2023-11-30 PROCEDURE — 84146 ASSAY OF PROLACTIN: CPT

## 2023-11-30 PROCEDURE — 83001 ASSAY OF GONADOTROPIN (FSH): CPT

## 2023-11-30 PROCEDURE — 82024 ASSAY OF ACTH: CPT

## 2023-11-30 PROCEDURE — 36415 COLL VENOUS BLD VENIPUNCTURE: CPT

## 2023-11-30 PROCEDURE — 84439 ASSAY OF FREE THYROXINE: CPT

## 2023-11-30 PROCEDURE — 82670 ASSAY OF TOTAL ESTRADIOL: CPT

## 2023-11-30 PROCEDURE — 82533 TOTAL CORTISOL: CPT

## 2023-12-01 LAB
ACTH PLAS-MCNC: 37 PG/ML (ref 6–58)
IGF-1 COLLECTION INFO: NORMAL
PROLACTIN SERPL-MCNC: 27.9 NG/ML (ref 4.79–23.3)
SOMATOMEDIN C: 209 NG/ML (ref 41–246)

## 2023-12-05 ENCOUNTER — HOSPITAL ENCOUNTER (OUTPATIENT)
Dept: MRI IMAGING | Age: 28
Discharge: HOME | End: 2023-12-07
Payer: COMMERCIAL

## 2023-12-05 DIAGNOSIS — D35.2 PITUITARY ADENOMA (HCC): ICD-10-CM

## 2023-12-05 PROCEDURE — 6360000004 HC RX CONTRAST MEDICATION: Performed by: INTERNAL MEDICINE

## 2023-12-05 PROCEDURE — 2580000003 HC RX 258: Performed by: INTERNAL MEDICINE

## 2023-12-05 PROCEDURE — A9579 GAD-BASE MR CONTRAST NOS,1ML: HCPCS | Performed by: INTERNAL MEDICINE

## 2023-12-05 PROCEDURE — 70553 MRI BRAIN STEM W/O & W/DYE: CPT

## 2023-12-05 RX ORDER — SODIUM CHLORIDE 0.9 % (FLUSH) 0.9 %
10 SYRINGE (ML) INJECTION PRN
Status: DISCONTINUED | OUTPATIENT
Start: 2023-12-05 | End: 2023-12-08 | Stop reason: HOSPADM

## 2023-12-05 RX ADMIN — GADOTERIDOL 13 ML: 279.3 INJECTION, SOLUTION INTRAVENOUS at 14:48

## 2023-12-05 RX ADMIN — SODIUM CHLORIDE, PRESERVATIVE FREE 10 ML: 5 INJECTION INTRAVENOUS at 14:48
